# Patient Record
Sex: FEMALE | Race: WHITE | Employment: FULL TIME | URBAN - METROPOLITAN AREA
[De-identification: names, ages, dates, MRNs, and addresses within clinical notes are randomized per-mention and may not be internally consistent; named-entity substitution may affect disease eponyms.]

---

## 2018-06-27 ENCOUNTER — THERAPY VISIT (OUTPATIENT)
Dept: PHYSICAL THERAPY | Facility: CLINIC | Age: 18
End: 2018-06-27
Payer: COMMERCIAL

## 2018-06-27 DIAGNOSIS — M22.2X1 PATELLOFEMORAL PAIN SYNDROME OF RIGHT KNEE: Primary | ICD-10-CM

## 2018-06-27 PROCEDURE — 97161 PT EVAL LOW COMPLEX 20 MIN: CPT | Mod: GP | Performed by: PHYSICAL THERAPIST

## 2018-06-27 PROCEDURE — 97530 THERAPEUTIC ACTIVITIES: CPT | Mod: GP | Performed by: PHYSICAL THERAPIST

## 2018-06-27 ASSESSMENT — ACTIVITIES OF DAILY LIVING (ADL)
GIVING WAY, BUCKLING OR SHIFTING OF KNEE: THE SYMPTOM AFFECTS MY ACTIVITY SLIGHTLY
RAW_SCORE: 56
WALK: ACTIVITY IS NOT DIFFICULT
RISE FROM A CHAIR: ACTIVITY IS NOT DIFFICULT
GO DOWN STAIRS: ACTIVITY IS NOT DIFFICULT
LIMPING: I DO NOT HAVE THE SYMPTOM
WEAKNESS: I DO NOT HAVE THE SYMPTOM
STAND: ACTIVITY IS NOT DIFFICULT
HOW_WOULD_YOU_RATE_THE_CURRENT_FUNCTION_OF_YOUR_KNEE_DURING_YOUR_USUAL_DAILY_ACTIVITIES_ON_A_SCALE_FROM_0_TO_100_WITH_100_BEING_YOUR_LEVEL_OF_KNEE_FUNCTION_PRIOR_TO_YOUR_INJURY_AND_0_BEING_THE_INABILITY_TO_PERFORM_ANY_OF_YOUR_USUAL_DAILY_ACTIVITIES?: 90
SWELLING: THE SYMPTOM AFFECTS MY ACTIVITY MODERATELY
GO UP STAIRS: ACTIVITY IS MINIMALLY DIFFICULT
KNEE_ACTIVITY_OF_DAILY_LIVING_SUM: 56
KNEEL ON THE FRONT OF YOUR KNEE: ACTIVITY IS MINIMALLY DIFFICULT
PAIN: THE SYMPTOM AFFECTS MY ACTIVITY MODERATELY
SQUAT: ACTIVITY IS SOMEWHAT DIFFICULT
STIFFNESS: I HAVE THE SYMPTOM BUT IT DOES NOT AFFECT MY ACTIVITY
KNEE_ACTIVITY_OF_DAILY_LIVING_SCORE: 80
AS_A_RESULT_OF_YOUR_KNEE_INJURY,_HOW_WOULD_YOU_RATE_YOUR_CURRENT_LEVEL_OF_DAILY_ACTIVITY?: NEARLY NORMAL
HOW_WOULD_YOU_RATE_THE_OVERALL_FUNCTION_OF_YOUR_KNEE_DURING_YOUR_USUAL_DAILY_ACTIVITIES?: NORMAL
SIT WITH YOUR KNEE BENT: ACTIVITY IS MINIMALLY DIFFICULT

## 2018-06-27 NOTE — MR AVS SNAPSHOT
After Visit Summary   6/27/2018    Amanda Arzate    MRN: [id number]           Patient Information     Date Of Birth          2000        Visit Information        Provider Department      6/27/2018 7:00 AM Enrique Whittington, PT [facility]        Today's Diagnoses     Patellofemoral pain syndrome of right knee    -  1       Follow-ups after your visit        Your next 10 appointments already scheduled     Jul 09, 2018  5:10 PM CDT   (Arrive by 4:55 PM)   ELVIN Extremity with Enrique Whittington PT   [facility] ([facility])    [address] [place] [id number]   [phone]            Jul 16, 2018  5:10 PM CDT   ELVIN Extremity with Enrique Whittington PT   [facility] ([facility])    [address] [place] [id number] [phone]            Jul 23, 2018  5:10 PM CDT   ELVIN Extremity with Enrique Whittington PT   [facility] ([facility])    [address] [place] [id number]   [phone]              Who to contact     If you have questions or need follow up information about today's clinic visit or your schedule please contact [facility] "[employer]" [facility] directly at [phone].  Normal or non-critical lab and imaging results will be communicated to you by MyChart, letter or phone within 4 business days after the clinic has received the results. If you do not hear from us within 7 days, please contact the clinic through MyChart or phone. If you have a critical or abnormal lab result, we will notify you by phone as soon as possible.  Submit refill requests through [employer] or call your pharmacy and they will forward the refill request to us. Please allow 3 business days for your refill to be completed.          Additional Information About Your Visit        Care EveryWhere ID      This is your Care EveryWhere ID. This could be used by other organizations to access your [place] medical records  [id number]         Blood Pressure from Last 3 Encounters:   No data found for BP    Weight from Last 3 Encounters:   No data found for Wt              We Performed the Following     HC PT EVAL, LOW COMPLEXITY     THERAPEUTIC ACTIVITIES        Primary Care Provider    None Specified       No primary provider on file.        Equal Access to Services     [facility]: Hadii aad ku hadasho Sokanuali, waaxda luqadaha, qaybta kaalmada angus, bee altamiranoaleidajian lawrence . So [facility] [phone].    ATENCIÓN: Si habla español, tiene a quinones disposición servicios gratuitos de asistencia lingüística. Llame al [phone].    We comply with applicable federal civil rights laws and [place] laws. We do not discriminate on the basis of race, color, national origin, age, disability, sex, sexual orientation, or gender identity.            Thank you!     Thank you for choosing [facility] [place]  for your care. Our goal is always to provide you with excellent care. Hearing back from our patients is one way we can continue to improve our services. Please take a few minutes to complete the written survey that you may receive in the mail after your visit with us. Thank you!             Your Updated Medication List - Protect others around you: Learn how to safely use, store and throw away your medicines at www.disposemymeds.org.      Notice  As of 6/27/2018  1:17 PM    You have not been prescribed any medications.

## 2018-06-27 NOTE — PROGRESS NOTES
Physical Therapy Initial Examination/Evaluation  June 27, 2018    Amanda Arzate is a 18 year old female referred to physical therapy by Jaylen Poole MD for treatment of R knee pain with Precautions/Restrictions/MD instructions E&T    Therapist Impression:   Amanda presents with symptoms consistent with PFPS.  We are going to trial taping today as she continues her current gym program.  We will see how she responds to taping and if she does well, we may consider taping or bracing for the future.  Her mechanics and strength are otherwise solid.     Subjective:  DOI/onset: 2 months ago; 6/18/2018 MD appt  DOS: none  Acute Injury or Gradual Onset?: Gradual injury over time  Mechanism of Injury: Sport; hockey, soccer  Related PMH: None Previous Treatment: Ice, compression Effect of prior treatment: fair  Imaging: MRI (none)  Chief Complaint/Functional Limitations:   Squatting, working out and see below in therapy evaluation codes   Pain: rest 0 /10, activity 5/10 Location: inferomedial patella region Frequency: Intermittent Described as: aching and dull Alleviated by: Rest and Ice Progression of Symptoms: Gradually getting worse. Time of day when pain is worse: Activity related  Sleeping: No issues/uninterrupted   Occupation: student  Job duties: prolonged sitting, keyboarding/computer use, pushing/pulling, driving  Current HEP/exercise regimen: Working out, jumping, squatting, 3 x week, running 10-15 min   Patient's goals are see chief complaints     Other pertinent PMH/Red Flags: Asthma   Barriers at home/work: None as reported by patient  Pertinent Surgical History: None  Medications: Hormone replacement and birth control  General health as reported by patient: excellent  Return to MD:  prn    KNEE EVALUATION    Static Posture  Pes planus B    Dynamic Movement Screen  Single leg stance observations: No significant findings for eyes open/closed  Double limb squat observations: Good technique/no significant  findings  Single limb squat observations: Good technique/no significant findings  Gait: Not assessed    Range of Motion  Hip Joint Screen: Negative      Knee ROM Extension Flexion   Left wnl wnl   Right wnl wnl      Flexibility Quadriceps Hamstrings Ankle Figure 4 Rafal's   Left mild mild none/WNL moderate NA   Right mild mild none/WNL moderate NA     Hip and Knee Strength   MMT Hip Abduction Hip Extension Hip ER Knee Flexion   Left 5-/5 5/5 5/5  na/5   Right 5-/5 5/5 5/5 na/5     Knee MMT Quadriceps set Straight Leg Raise   Left Good Able   Right Good Able     Knee ligaments and meniscus: Negative knee effusion    Patellofemoral assessment: Lateral patellar tilt B    Palpation  Left: Not assessed  Right: Not assessed    Assessment/Plan:  Patient is a 18 year old female with right side knee complaints.    Patient has the following significant findings with corresponding treatment plan.                Diagnosis 1:  R knee pain  Pain -  hot/cold therapy, manual therapy, splint/taping/bracing/orthotics, self management, education and home program  Decreased strength - therapeutic exercise and therapeutic activities  Impaired muscle performance - neuro re-education    Therapy Evaluation Codes:   1) History comprised of:   Personal factors that impact the plan of care:      None.    Comorbidity factors that impact the plan of care are:      None.     Medications impacting care: None.  2) Examination of Body Systems comprised of:   Body structures and functions that impact the plan of care:      Knee.   Activity limitations that impact the plan of care are:      Lifting, Sports and Squatting/kneeling.  3) Clinical presentation characteristics are:   Evolving/Changing.  4) Decision-Making    Low complexity using standardized patient assessment instrument and/or measureable assessment of functional outcome.  Cumulative Therapy Evaluation is: Low complexity.    Previous and current functional limitations:  (See Goal Flow  Sheet for this information)    Short term and Long term goals: (See Goal Flow Sheet for this information)     Communication ability:  Patient appears to be able to clearly communicate and understand verbal and written communication and follow directions correctly.  Treatment Explanation - The following has been discussed with the patient:   RX ordered/plan of care  Anticipated outcomes  Possible risks and side effects  This patient would benefit from PT intervention to resume normal activities.   Rehab potential is good.    Frequency:  1 X week, once daily  Duration:  for 6 weeks  Discharge Plan:  Achieve all LTG.  Independent in home treatment program.  Reach maximal therapeutic benefit.    Please refer to the daily flowsheet for treatment today, total treatment time and time spent performing 1:1 timed codes.     Please refer to the daily flowsheet for treatment today, total treatment time and time spent performing 1:1 timed codes.

## 2018-07-09 ENCOUNTER — THERAPY VISIT (OUTPATIENT)
Dept: PHYSICAL THERAPY | Facility: CLINIC | Age: 18
End: 2018-07-09
Payer: COMMERCIAL

## 2018-07-09 DIAGNOSIS — M22.2X1 PATELLOFEMORAL PAIN SYNDROME OF RIGHT KNEE: ICD-10-CM

## 2018-07-09 PROCEDURE — 97110 THERAPEUTIC EXERCISES: CPT | Mod: GP | Performed by: PHYSICAL THERAPIST

## 2018-07-09 PROCEDURE — 97530 THERAPEUTIC ACTIVITIES: CPT | Mod: GP | Performed by: PHYSICAL THERAPIST

## 2018-07-09 NOTE — MR AVS SNAPSHOT
After Visit Summary   7/9/2018    Amanda Arzate    MRN: [id number]           Patient Information     Date Of Birth          2000        Visit Information        Provider Department      7/9/2018 5:10 PM Enrique Whittington PT [facility]        Today's Diagnoses     Patellofemoral pain syndrome of right knee           Follow-ups after your visit        Your next 10 appointments already scheduled     Jul 23, 2018  5:10 PM CDT   ELVIN Extremity with Enrique Whittington PT   [facility] [place] ([address] [id number] [phone]              Who to contact     If you have questions or need follow up information about today's clinic visit or your schedule please contact [facility] [employer] directly at [phone].  Normal or non-critical lab and imaging results will be communicated to you by MyChart, letter or phone within 4 business days after the clinic has received the results. If you do not hear from us within 7 days, please contact the clinic through MyChart or phone. If you have a critical or abnormal lab result, we will notify you by phone as soon as possible.  Submit refill requests through [employer] or call your pharmacy and they will forward the refill request to us. Please allow 3 business days for your refill to be completed.          Additional Information About Your Visit        Care EveryWhere ID     This is your Care EveryWhere ID. This could be used by other organizations to access your [place] medical records  [id number]         Blood Pressure from Last 3 Encounters:   No data found for BP    Weight from Last 3 Encounters:   No data found for Wt              We Performed the Following     THERAPEUTIC ACTIVITIES     THERAPEUTIC EXERCISES        Primary Care Provider    None Specified       No primary provider on file.        Equal Access to Services      [facility] : Hadii aad ku hadrefugiodot Alexandruali, waalejandroda luqadaha, qaybta kaalmada bradyritamaryann, bee altamiranoaleidajian mathis. So [facility] [phone].    ATENCIÓN: Si habla español, tiene a quinones disposición servicios gratuitos de asistencia lingüística. Llame al [phone].    We comply with applicable federal civil rights laws and [place] laws. We do not discriminate on the basis of race, color, national origin, age, disability, sex, sexual orientation, or gender identity.            Thank you!     Thank you for choosing [facility] [place]  for your care. Our goal is always to provide you with excellent care. Hearing back from our patients is one way we can continue to improve our services. Please take a few minutes to complete the written survey that you may receive in the mail after your visit with us. Thank you!             Your Updated Medication List - Protect others around you: Learn how to safely use, store and throw away your medicines at www.disposemymeds.org.      Notice  As of 7/9/2018  5:51 PM    You have not been prescribed any medications.

## 2018-07-09 NOTE — PROGRESS NOTES
Flexibility Quadriceps Hamstrings Ankle Figure 4 Rafal's   Left mild moderate none/WNL moderate NA   Right mild moderate none/WNL moderate NA

## 2018-07-23 ENCOUNTER — THERAPY VISIT (OUTPATIENT)
Dept: PHYSICAL THERAPY | Facility: CLINIC | Age: 18
End: 2018-07-23
Payer: COMMERCIAL

## 2018-07-23 DIAGNOSIS — M22.2X1 PATELLOFEMORAL PAIN SYNDROME OF RIGHT KNEE: ICD-10-CM

## 2018-07-23 PROCEDURE — 97530 THERAPEUTIC ACTIVITIES: CPT | Mod: GP | Performed by: PHYSICAL THERAPIST

## 2018-07-23 NOTE — MR AVS SNAPSHOT
After Visit Summary   7/23/2018    Amanda Arzate    MRN: [id number]           Patient Information     Date Of Birth          2000        Visit Information        Provider Department      7/23/2018 5:10 PM Enrique Whittington PT [facility]        Today's Diagnoses     Patellofemoral pain syndrome of right knee           Follow-ups after your visit        Who to contact     If you have questions or need follow up information about today's clinic visit or your schedule please contact [facility] [place] directly at [phone].  Normal or non-critical lab and imaging results will be communicated to you by MyChart, letter or phone within 4 business days after the clinic has received the results. If you do not hear from us within 7 days, please contact the clinic through MyChart or phone. If you have a critical or abnormal lab result, we will notify you by phone as soon as possible.  Submit refill requests through [employer] or call your pharmacy and they will forward the refill request to us. Please allow 3 business days for your refill to be completed.          Additional Information About Your Visit        Care EveryWhere ID     This is your Care EveryWhere ID. This could be used by other organizations to access your [place] medical records  [id number]         Blood Pressure from Last 3 Encounters:   No data found for BP    Weight from Last 3 Encounters:   No data found for Wt              We Performed the Following     THERAPEUTIC ACTIVITIES        Primary Care Provider    None Specified       No primary provider on file.        Equal Access to Services     [facility] : Hadii winnie lehmano Soarmando, waaxda luqadaha, qaybta kaalmada angus, bee mathis. So [facility] [phone].    ATENCIÓN: Si habla español, tiene a quinones disposición servicios gratuitos de asistencia lingüística. Llame al [phone].    We  comply with applicable federal civil rights laws and [place] laws. We do not discriminate on the basis of race, color, national origin, age, disability, sex, sexual orientation, or gender identity.            Thank you!     Thank you for choosing [facility] [place]  for your care. Our goal is always to provide you with excellent care. Hearing back from our patients is one way we can continue to improve our services. Please take a few minutes to complete the written survey that you may receive in the mail after your visit with us. Thank you!             Your Updated Medication List - Protect others around you: Learn how to safely use, store and throw away your medicines at www.disposemymeds.org.      Notice  As of 7/23/2018  5:47 PM    You have not been prescribed any medications.

## 2018-07-23 NOTE — PROGRESS NOTES
PROGRESS REPORT    Amanda has been in therapy from June 27, 2018 to Jul 23, 2018 for treatment of R knee pain.    Therapist Impression:  Amanda is doing great with therapy.  Symptoms are well managed and she is able to skate and strength train without pain.  At this point, she will transition into her team's strength and conditioning program and will follow up as needed.    Subjective:  Doing well.  Back to lifting and skating without pain    Objective:    Hip and Knee Strength   MMT Hip Abduction Hip Extension Hip ER Knee Flexion   Left 30 lbs 5/5 5/5  na/5   Right 31lbs 5/5 5/5 na/5       Flexibility Quadriceps Hamstrings Ankle Figure 4 Rafal's   Left mild mild none/WNL mild NA   Right mild mild none/WNL mild NA        ASSESSMENT/PLAN  Updated problem list and treatment plan: The encounter diagnosis was Patellofemoral pain syndrome of right knee. Pain - HEP  Decreased ROM/flexibility - HEP  Decreased strength - HEP  STG/LTGs have been met or progress has been made towards goals:  Yes (See Goal flow sheet completed today.)  Assessment of Progress: The patient's condition is improving.  The patient has met all of their long term goals.  Self Management Plans:  Patient has been instructed in a home treatment program.  Patient is independent in a home treatment program.  Patient  has been instructed in self management of symptoms.  Patient is independent in self management of symptoms.  I have re-evaluated this patient and find that the nature, scope, duration and intensity of the therapy is appropriate for the medical condition of the patient.  Amanda continues to require the following intervention to meet STG and LTG's:  PT intervention is no longer required to meet STG/LTG.    Recommendations:  This patient is ready to be discharged from therapy and continue their home treatment program.          Please refer to the daily flowsheet for treatment today, total treatment time and time spent performing 1:1  timed codes.